# Patient Record
Sex: FEMALE | Race: WHITE | URBAN - METROPOLITAN AREA
[De-identification: names, ages, dates, MRNs, and addresses within clinical notes are randomized per-mention and may not be internally consistent; named-entity substitution may affect disease eponyms.]

---

## 2022-04-03 ENCOUNTER — INPATIENT HOSPITAL (OUTPATIENT)
Dept: URBAN - METROPOLITAN AREA MEDICAL CENTER 11 | Facility: MEDICAL CENTER | Age: 52
End: 2022-04-03
Payer: COMMERCIAL

## 2022-04-03 DIAGNOSIS — R19.7 DIARRHEA, UNSPECIFIED: ICD-10-CM

## 2022-04-03 DIAGNOSIS — R10.31 RIGHT LOWER QUADRANT PAIN: ICD-10-CM

## 2022-04-03 DIAGNOSIS — R10.32 LEFT LOWER QUADRANT PAIN: ICD-10-CM

## 2022-04-03 DIAGNOSIS — R93.3 ABNORMAL FINDINGS ON DIAGNOSTIC IMAGING OF OTHER PARTS OF DI: ICD-10-CM

## 2022-04-03 DIAGNOSIS — D64.9 ANEMIA, UNSPECIFIED: ICD-10-CM

## 2022-04-03 PROCEDURE — 99222 1ST HOSP IP/OBS MODERATE 55: CPT | Performed by: INTERNAL MEDICINE

## 2022-04-04 ENCOUNTER — INPATIENT HOSPITAL (OUTPATIENT)
Dept: URBAN - METROPOLITAN AREA MEDICAL CENTER 11 | Facility: MEDICAL CENTER | Age: 52
End: 2022-04-04
Payer: COMMERCIAL

## 2022-04-04 DIAGNOSIS — R19.7 DIARRHEA, UNSPECIFIED: ICD-10-CM

## 2022-04-04 DIAGNOSIS — R93.5 ABNORMAL FINDINGS ON DIAGNOSTIC IMAGING OF OTHER ABDOMINAL R: ICD-10-CM

## 2022-04-04 DIAGNOSIS — K52.9 NONINFECTIVE GASTROENTERITIS AND COLITIS, UNSPECIFIED: ICD-10-CM

## 2022-04-04 PROCEDURE — 45380 COLONOSCOPY AND BIOPSY: CPT | Performed by: SPECIALIST

## 2022-04-18 ENCOUNTER — OFFICE (OUTPATIENT)
Dept: URBAN - METROPOLITAN AREA CLINIC 67 | Facility: CLINIC | Age: 52
End: 2022-04-18
Payer: COMMERCIAL

## 2022-04-18 VITALS
SYSTOLIC BLOOD PRESSURE: 98 MMHG | DIASTOLIC BLOOD PRESSURE: 58 MMHG | HEART RATE: 75 BPM | HEIGHT: 64 IN | WEIGHT: 174 LBS

## 2022-04-18 DIAGNOSIS — K50.10 CROHN'S DISEASE OF LARGE INTESTINE WITHOUT COMPLICATIONS: ICD-10-CM

## 2022-04-18 PROCEDURE — 99214 OFFICE O/P EST MOD 30 MIN: CPT | Performed by: INTERNAL MEDICINE

## 2022-04-18 NOTE — SERVICEHPINOTES
Josette Figueroa   is seen today for a follow-up visit.     The patient is seen today for follow-up after a recent hospitalization.She was admitted to [facility] on 4/2/22 secondary to a 2-3 week history of lower abdominal discomfort. bloating and diarrhea. brHer evaluation in the ER included a contrasted CT of the abd/pelvis which was remarkable for wall thickening of the entirety of the colon consistent with pan-colitis. brTesting for C.diff and stool culture were negative - therefore, she underwent an colonoscopy with Dr. Palencia on 4/4/22 which was remarkable for multiple ulcerations with edema in the descending colon and an ulceration in the rectum at the dentate line. Her exam was only completed to the descending colon due to inadequate prep.brA colonoscopy to the cecum done in 2012 (secondary to hemoccult Biopsies of the descending colon demonstrated focal active colitis with a single non-necrotizing granuloma without dysplasia or malignancy. Rectal biopsies also showed focal active colitis without granuloma, dysplasia or malignancy. Staining for CMV, fungus and AFB were negative. Her symptoms improved with IV steroids and she was discharged home on 4/7/22 on oral prednisone - her most recent CBC done on 4/5/22 was remarkable for a wbc count 11.5 and Hgb 11.0.Today, she reports that she has been feeling better with less frequent BMs that are more formed - she has occasional rectal bleeding but no hematochezia. There is no known family history of IBD.

## 2022-04-18 NOTE — SERVICENOTES
We discussed her colonoscopy/pathology results and my suspicion that she has Crohn's colitis. We discussed tapering down off her prednisone (that prescription sent in today) and that she needs to take an OTC calcium supplement in addition to the Vit. D supplement she is already taking. 
We also discussed transitioning her to either Imuran or a Biologic for long term treatment as a steroid sparing agent. We discussed the risks/benefits of each class and she will consider these options. I will have her return in 2-3 weeks for follow-up and to see which medication she would like to go with.

## 2022-05-10 ENCOUNTER — OFFICE (OUTPATIENT)
Dept: URBAN - METROPOLITAN AREA CLINIC 67 | Facility: CLINIC | Age: 52
End: 2022-05-10
Payer: COMMERCIAL

## 2022-05-10 VITALS
DIASTOLIC BLOOD PRESSURE: 72 MMHG | OXYGEN SATURATION: 98 % | HEART RATE: 93 BPM | HEIGHT: 64 IN | WEIGHT: 180 LBS | SYSTOLIC BLOOD PRESSURE: 118 MMHG

## 2022-05-10 DIAGNOSIS — K50.10 CROHN'S DISEASE OF LARGE INTESTINE WITHOUT COMPLICATIONS: ICD-10-CM

## 2022-05-10 PROCEDURE — 99214 OFFICE O/P EST MOD 30 MIN: CPT | Performed by: INTERNAL MEDICINE

## 2022-05-10 NOTE — SERVICENOTES
We discussed that given her endoscopy findings, path results and IBD panel results, I suspect that she has underlying Crohn's colitis - we discussed transitioning to either Imuran or a biologic and discussed the risks/benefits of each class of medication. She would like to go with Imuran and I will check her TPMT enzyme activity level today. She will continue to taper off her prednisone and I recommended that she take an OTC calcium-VitD supplement while on corticosteroids. I will plan to have her return to see me for follow-up 4 weeks after she has started the Imuran.

## 2022-05-10 NOTE — SERVICEHPINOTES
Josette Figueroa   is seen today for a follow-up visit   regarding suspected IBD.She was admitted to [facility] on 4/2/22 secondary to a 2-3 week history of lower abdominal discomfort. bloating and diarrhea.[facility] evaluation in the ER included a contrasted CT of the abd/pelvis which was remarkable for wall thickening of the entirety of the colon consistent with pan-colitis.brTesting for C.diff and stool culture were negative - she then underwent an colonoscopy with Dr. Palencia on 4/4/22 which was remarkable for multiple ulcerations with edema in the descending colon and an ulceration in the rectum at the dentate line. Her exam was only completed to the descending colon due to inadequate prepbr.brBiopsies of the descending colon demonstrated focal active colitis with a single non-necrotizing granuloma without dysplasia or malignancy. Rectal biopsies also showed focal active colitis without granuloma, dysplasia or malignancy. Staining for CMV, fungus and AFB were negative.[facility] Del Carmen symptoms improved with IV steroids and she was discharged home on 4/7/22 on oral prednisone - her most recent CBC done on 4/5/22 was remarkable for a wbc count 11.5 and Hgb 11.0.A colonoscopy to the cecum done in 2012 (secondary to hemoccult positive stool) was only remarkable for internal hemorrhoids - there is no known family history of IBD. At the time of her last appointment on 4/18/22, an IBD Expanded Panel demonstrated results in a pattern suggestive of Crohn's disease. brToday, she reports that she is down to 25mg/day of prednisone and has been feeling very well. Her bowel movements are formed without any hematochezia/rectal bleeding or abdominal discomfort. br

## 2023-08-22 ENCOUNTER — APPOINTMENT (OUTPATIENT)
Dept: URBAN - METROPOLITAN AREA SURGERY 12 | Age: 53
Setting detail: DERMATOLOGY
End: 2023-08-22

## 2023-08-22 DIAGNOSIS — L82.1 OTHER SEBORRHEIC KERATOSIS: ICD-10-CM

## 2023-08-22 DIAGNOSIS — K13.0 DISEASES OF LIPS: ICD-10-CM

## 2023-08-22 DIAGNOSIS — D22 MELANOCYTIC NEVI: ICD-10-CM

## 2023-08-22 DIAGNOSIS — L81.4 OTHER MELANIN HYPERPIGMENTATION: ICD-10-CM

## 2023-08-22 DIAGNOSIS — D18.0 HEMANGIOMA: ICD-10-CM

## 2023-08-22 PROBLEM — D48.5 NEOPLASM OF UNCERTAIN BEHAVIOR OF SKIN: Status: ACTIVE | Noted: 2023-08-22

## 2023-08-22 PROBLEM — D22.5 MELANOCYTIC NEVI OF TRUNK: Status: ACTIVE | Noted: 2023-08-22

## 2023-08-22 PROBLEM — D18.01 HEMANGIOMA OF SKIN AND SUBCUTANEOUS TISSUE: Status: ACTIVE | Noted: 2023-08-22

## 2023-08-22 PROCEDURE — 11102 TANGNTL BX SKIN SINGLE LES: CPT

## 2023-08-22 PROCEDURE — OTHER PRESCRIPTION: OTHER

## 2023-08-22 PROCEDURE — OTHER BIOPSY BY SHAVE METHOD: OTHER

## 2023-08-22 PROCEDURE — 99204 OFFICE O/P NEW MOD 45 MIN: CPT | Mod: 25

## 2023-08-22 PROCEDURE — OTHER COUNSELING: OTHER

## 2023-08-22 RX ORDER — DESONIDE 0.5 MG/G
CREAM TOPICAL
Qty: 60 | Refills: 0 | Status: ERX | COMMUNITY
Start: 2023-08-22

## 2023-08-22 ASSESSMENT — LOCATION SIMPLE DESCRIPTION DERM
LOCATION SIMPLE: ABDOMEN
LOCATION SIMPLE: RIGHT LOWER LIP
LOCATION SIMPLE: CHEST

## 2023-08-22 ASSESSMENT — LOCATION DETAILED DESCRIPTION DERM
LOCATION DETAILED: MIDDLE STERNUM
LOCATION DETAILED: LOWER STERNUM
LOCATION DETAILED: XIPHOID
LOCATION DETAILED: RIGHT INFERIOR VERMILION BORDER

## 2023-08-22 ASSESSMENT — LOCATION ZONE DERM
LOCATION ZONE: LIP
LOCATION ZONE: TRUNK

## 2023-08-22 NOTE — HPI: FULL BODY SKIN EXAMINATION
Increase exercise - try at least 1/2 hour per day of walking.   Continue meds.   Test blood sugar 2x/day.   Return to clinic in 2-3 months with labs prior.   
What Is The Reason For Today's Visit?: Full Body Skin Examination
What Is The Reason For Today's Visit? (Being Monitored For X): concerning skin lesions on a periodic basis

## 2023-08-22 NOTE — PROCEDURE: COUNSELING
Detail Level: Generalized
Patient Specific Counseling (Will Not Stick From Patient To Patient): start desonide cr bid prn and rec vaseline mult times a day
Detail Level: Detailed

## 2024-08-22 ENCOUNTER — APPOINTMENT (OUTPATIENT)
Dept: URBAN - METROPOLITAN AREA SURGERY 12 | Age: 54
Setting detail: DERMATOLOGY
End: 2024-08-22

## 2024-08-22 DIAGNOSIS — D18.0 HEMANGIOMA: ICD-10-CM

## 2024-08-22 DIAGNOSIS — D22 MELANOCYTIC NEVI: ICD-10-CM

## 2024-08-22 DIAGNOSIS — L82.1 OTHER SEBORRHEIC KERATOSIS: ICD-10-CM

## 2024-08-22 DIAGNOSIS — L81.4 OTHER MELANIN HYPERPIGMENTATION: ICD-10-CM

## 2024-08-22 DIAGNOSIS — L73.8 OTHER SPECIFIED FOLLICULAR DISORDERS: ICD-10-CM

## 2024-08-22 PROBLEM — D18.01 HEMANGIOMA OF SKIN AND SUBCUTANEOUS TISSUE: Status: ACTIVE | Noted: 2024-08-22

## 2024-08-22 PROBLEM — D22.5 MELANOCYTIC NEVI OF TRUNK: Status: ACTIVE | Noted: 2024-08-22

## 2024-08-22 PROCEDURE — OTHER ADDITIONAL NOTES: OTHER

## 2024-08-22 PROCEDURE — 99213 OFFICE O/P EST LOW 20 MIN: CPT

## 2024-08-22 PROCEDURE — OTHER COUNSELING: OTHER

## 2024-08-22 ASSESSMENT — LOCATION DETAILED DESCRIPTION DERM
LOCATION DETAILED: RIGHT DISTAL PRETIBIAL REGION
LOCATION DETAILED: LOWER STERNUM
LOCATION DETAILED: NASAL DORSUM
LOCATION DETAILED: MIDDLE STERNUM
LOCATION DETAILED: XIPHOID

## 2024-08-22 ASSESSMENT — LOCATION SIMPLE DESCRIPTION DERM
LOCATION SIMPLE: ABDOMEN
LOCATION SIMPLE: CHEST
LOCATION SIMPLE: RIGHT PRETIBIAL REGION
LOCATION SIMPLE: NOSE

## 2024-08-22 ASSESSMENT — LOCATION ZONE DERM
LOCATION ZONE: LEG
LOCATION ZONE: TRUNK
LOCATION ZONE: NOSE

## 2024-08-22 NOTE — PROCEDURE: ADDITIONAL NOTES
Additional Notes: Discussed future ISK LN2 if lesion becomes bothersome w/ shaving
Detail Level: Detailed
Render Risk Assessment In Note?: no
Additional Notes: Discussed future cosmetic laser treatment if desired
Detail Level: Simple
Additional Notes: Previously bx’d; path proven Patulous Follicle. Discussed future punch exc if desired, will scheduke when desired